# Patient Record
Sex: FEMALE | Race: WHITE | NOT HISPANIC OR LATINO | URBAN - METROPOLITAN AREA
[De-identification: names, ages, dates, MRNs, and addresses within clinical notes are randomized per-mention and may not be internally consistent; named-entity substitution may affect disease eponyms.]

---

## 2019-01-05 ENCOUNTER — OFFICE VISIT (OUTPATIENT)
Dept: RETAIL CLINIC | Facility: CLINIC | Age: 74
End: 2019-01-05

## 2019-01-05 VITALS
TEMPERATURE: 97.2 F | BODY MASS INDEX: 32.2 KG/M2 | SYSTOLIC BLOOD PRESSURE: 150 MMHG | HEART RATE: 85 BPM | OXYGEN SATURATION: 98 % | DIASTOLIC BLOOD PRESSURE: 75 MMHG | HEIGHT: 62 IN | WEIGHT: 175 LBS | RESPIRATION RATE: 20 BRPM

## 2019-01-05 DIAGNOSIS — J20.9 ACUTE BRONCHITIS, UNSPECIFIED ORGANISM: Primary | ICD-10-CM

## 2019-01-05 DIAGNOSIS — J30.9 ALLERGIC RHINITIS, UNSPECIFIED SEASONALITY, UNSPECIFIED TRIGGER: ICD-10-CM

## 2019-01-05 PROCEDURE — 99213 OFFICE O/P EST LOW 20 MIN: CPT | Performed by: NURSE PRACTITIONER

## 2019-01-05 RX ORDER — GUAIFENESIN 600 MG/1
1200 TABLET, EXTENDED RELEASE ORAL 2 TIMES DAILY PRN
Qty: 40 TABLET | Refills: 0 | Status: SHIPPED | OUTPATIENT
Start: 2019-01-05 | End: 2019-01-15

## 2019-01-05 RX ORDER — METHYLPREDNISOLONE 4 MG/1
TABLET ORAL
Qty: 21 TABLET | Refills: 0 | Status: SHIPPED | OUTPATIENT
Start: 2019-01-05

## 2019-01-05 RX ORDER — DOXYCYCLINE 100 MG/1
100 CAPSULE ORAL 2 TIMES DAILY
Qty: 20 CAPSULE | Refills: 0 | Status: SHIPPED | OUTPATIENT
Start: 2019-01-05 | End: 2019-01-15

## 2019-01-05 RX ORDER — BENZONATATE 200 MG/1
200 CAPSULE ORAL 3 TIMES DAILY PRN
Qty: 30 CAPSULE | Refills: 0 | Status: SHIPPED | OUTPATIENT
Start: 2019-01-05

## 2019-01-05 RX ORDER — ROSUVASTATIN CALCIUM 10 MG/1
10 TABLET, COATED ORAL DAILY
COMMUNITY

## 2019-01-05 RX ORDER — ALBUTEROL SULFATE 90 UG/1
2 AEROSOL, METERED RESPIRATORY (INHALATION) EVERY 4 HOURS PRN
COMMUNITY
End: 2019-01-05

## 2019-01-05 RX ORDER — LISINOPRIL 20 MG/1
20 TABLET ORAL DAILY
COMMUNITY

## 2019-01-05 RX ORDER — ALBUTEROL SULFATE 90 UG/1
2 AEROSOL, METERED RESPIRATORY (INHALATION) EVERY 4 HOURS PRN
Qty: 6.7 G | Refills: 0 | Status: SHIPPED | OUTPATIENT
Start: 2019-01-05 | End: 2019-02-04

## 2019-01-05 RX ORDER — FEXOFENADINE HYDROCHLORIDE 60 MG/1
60 TABLET, FILM COATED ORAL DAILY
Qty: 30 TABLET | Refills: 0 | Status: SHIPPED | OUTPATIENT
Start: 2019-01-05

## 2019-01-05 NOTE — PATIENT INSTRUCTIONS
Acute Bronchitis, Adult  Acute bronchitis is when air tubes (bronchi) in the lungs suddenly get swollen. The condition can make it hard to breathe. It can also cause these symptoms:  · A cough.  · Coughing up clear, yellow, or green mucus.  · Wheezing.  · Chest congestion.  · Shortness of breath.  · A fever.  · Body aches.  · Chills.  · A sore throat.    Follow these instructions at home:  Medicines  · Take over-the-counter and prescription medicines only as told by your doctor.  · If you were prescribed an antibiotic medicine, take it as told by your doctor. Do not stop taking the antibiotic even if you start to feel better.  General instructions  · Rest.  · Drink enough fluids to keep your pee (urine) clear or pale yellow.  · Avoid smoking and secondhand smoke. If you smoke and you need help quitting, ask your doctor. Quitting will help your lungs heal faster.  · Use an inhaler, cool mist vaporizer, or humidifier as told by your doctor.  · Keep all follow-up visits as told by your doctor. This is important.  How is this prevented?  To lower your risk of getting this condition again:  · Wash your hands often with soap and water. If you cannot use soap and water, use hand .  · Avoid contact with people who have cold symptoms.  · Try not to touch your hands to your mouth, nose, or eyes.  · Make sure to get the flu shot every year.    Contact a doctor if:  · Your symptoms do not get better in 2 weeks.  Get help right away if:  · You cough up blood.  · You have chest pain.  · You have very bad shortness of breath.  · You become dehydrated.  · You faint (pass out) or keep feeling like you are going to pass out.  · You keep throwing up (vomiting).  · You have a very bad headache.  · Your fever or chills gets worse.  This information is not intended to replace advice given to you by your health care provider. Make sure you discuss any questions you have with your health care provider.  Document Released:  06/05/2009 Document Revised: 07/26/2017 Document Reviewed: 06/07/2017  [employer] Interactive Patient Education © 2018 [employer]    -Pt to take home nasal spray as prescribed.   --Pt notified to recheck BP in 1-2 days with home BP cuff and f/u with PCP if continues to remain high.

## 2019-01-05 NOTE — PROGRESS NOTES
Subjective   Ale Antoine is a 73 y.o. female.     Pt has had a cough with congestion for the last week      URI    This is a new problem. The current episode started 1 to 4 weeks ago. The problem has been gradually worsening. There has been no fever. Associated symptoms include congestion (clear-green drainage), coughing, headaches, rhinorrhea and a sore throat (in the morning). Pertinent negatives include no abdominal pain, chest pain, diarrhea, dysuria, ear pain, joint pain, joint swelling, nausea, neck pain, plugged ear sensation, rash, sinus pain, sneezing, swollen glands, vomiting or wheezing. She has tried nothing for the symptoms. The treatment provided no relief.        Current Outpatient Medications on File Prior to Visit   Medication Sig Dispense Refill   • lisinopril (PRINIVIL,ZESTRIL) 20 MG tablet Take 20 mg by mouth Daily.     • rosuvastatin (CRESTOR) 10 MG tablet Take 10 mg by mouth Daily.     • [DISCONTINUED] albuterol sulfate  (90 Base) MCG/ACT inhaler Inhale 2 puffs Every 4 (Four) Hours As Needed for Wheezing.       No current facility-administered medications on file prior to visit.        Allergies   Allergen Reactions   • Ampicillin Rash   • Sulfa Antibiotics Rash       Past Medical History:   Diagnosis Date   • Allergic    • Asthma    • Elevated cholesterol    • Hypertension    • Kidney stone        Past Surgical History:   Procedure Laterality Date   • ARTERY BIOPSY      temporal arteries       Family History   Problem Relation Age of Onset   • Cancer Mother    • Cancer Father    • Cancer Sister    • Cancer Brother        Social History     Socioeconomic History   • Marital status: Unknown     Spouse name: Not on file   • Number of children: Not on file   • Years of education: Not on file   • Highest education level: Not on file   Social Needs   • Financial resource strain: Not on file   • Food insecurity - worry: Not on file   • Food insecurity - inability: Not on file   •  "Transportation needs - medical: Not on file   • Transportation needs - non-medical: Not on file   Occupational History   • Not on file   Tobacco Use   • Smoking status: Never Smoker   Substance and Sexual Activity   • Alcohol use: No     Frequency: Never   • Drug use: No   • Sexual activity: Defer   Other Topics Concern   • Not on file   Social History Narrative   • Not on file       Review of Systems   Constitutional: Positive for fatigue. Negative for activity change, appetite change, chills, diaphoresis and fever.   HENT: Positive for congestion (clear-green drainage), rhinorrhea, sore throat (in the morning) and voice change. Negative for ear pain, sinus pressure, sinus pain and sneezing.    Eyes: Negative for pain, discharge, redness and itching.   Respiratory: Positive for cough and chest tightness. Negative for shortness of breath and wheezing.    Cardiovascular: Negative for chest pain.   Gastrointestinal: Negative for abdominal pain, diarrhea, nausea and vomiting.   Genitourinary: Negative for dysuria.   Musculoskeletal: Negative for arthralgias, joint pain, myalgias and neck pain.   Skin: Negative for rash.   Allergic/Immunologic: Positive for environmental allergies.   Neurological: Positive for headaches.   Psychiatric/Behavioral: Negative for agitation.       /75   Pulse 85   Temp 97.2 °F (36.2 °C) (Oral)   Resp 20   Ht 157.5 cm (62\")   Wt 79.4 kg (175 lb)   SpO2 98%   BMI 32.01 kg/m²     Objective   Physical Exam   Constitutional: She is oriented to person, place, and time. She appears well-developed and well-nourished. She is cooperative. She appears ill.   HENT:   Head: Normocephalic and atraumatic.   Right Ear: Tympanic membrane, external ear and ear canal normal.   Left Ear: Tympanic membrane, external ear and ear canal normal.   Nose: Mucosal edema and rhinorrhea present. Right sinus exhibits maxillary sinus tenderness and frontal sinus tenderness. Left sinus exhibits no maxillary " sinus tenderness and no frontal sinus tenderness.   Mouth/Throat: Uvula is midline and mucous membranes are normal. Posterior oropharyngeal erythema present. No oropharyngeal exudate or posterior oropharyngeal edema.   Eyes: Conjunctivae and lids are normal.   Cardiovascular: Normal heart sounds.   Pulmonary/Chest: Effort normal and breath sounds normal.   Abdominal: There is no tenderness.   Lymphadenopathy:     She has cervical adenopathy.   Neurological: She is alert and oriented to person, place, and time.   Skin: Skin is warm and dry. No rash noted.   Psychiatric: She has a normal mood and affect. Her speech is normal and behavior is normal.       Procedures None    Assessment/Plan   Diagnoses and all orders for this visit:    Acute bronchitis, unspecified organism    Allergic rhinitis, unspecified seasonality, unspecified trigger    Other orders  -     benzonatate (TESSALON) 200 MG capsule; Take 1 capsule by mouth 3 (Three) Times a Day As Needed for Cough.  -     guaiFENesin (MUCINEX) 600 MG 12 hr tablet; Take 2 tablets by mouth 2 (Two) Times a Day As Needed for Cough for up to 10 days.  -     fexofenadine (ALLEGRA ALLERGY) 60 MG tablet; Take 1 tablet by mouth Daily.  -     doxycycline (MONODOX) 100 MG capsule; Take 1 capsule by mouth 2 (Two) Times a Day for 10 days.  -     albuterol sulfate  (90 Base) MCG/ACT inhaler; Inhale 2 puffs Every 4 (Four) Hours As Needed for Wheezing for up to 30 days.  -     MethylPREDNISolone (MEDROL, TIGIST,) 4 MG tablet; Take as directed on package instructions.        No results found for this or any previous visit.  -Education done on high BP with pt. Pt states it does not normally run this high.   Follow up with PCP or go to the nearest emergency room if symptoms worsen or fail to improve.

## 2023-06-07 ENCOUNTER — TRANSCRIBE ORDERS (OUTPATIENT)
Dept: ADMINISTRATIVE | Facility: HOSPITAL | Age: 78
End: 2023-06-07
Payer: MEDICARE

## 2023-06-07 DIAGNOSIS — Z12.31 VISIT FOR SCREENING MAMMOGRAM: Primary | ICD-10-CM

## 2024-07-18 PROBLEM — M85.80 OSTEOPENIA: Status: ACTIVE | Noted: 2024-07-18

## 2024-07-18 PROBLEM — L40.9 PSORIASIS: Status: ACTIVE | Noted: 2024-07-18

## 2024-07-18 PROBLEM — H35.30 MACULAR DEGENERATION: Status: ACTIVE | Noted: 2024-07-18

## 2024-07-18 PROBLEM — M19.90 OSTEOARTHRITIS: Status: ACTIVE | Noted: 2024-07-18

## 2024-07-18 PROBLEM — M70.62 TROCHANTERIC BURSITIS OF LEFT HIP: Status: ACTIVE | Noted: 2024-07-18

## 2024-07-18 PROBLEM — Z87.39 HISTORY OF TEMPORAL ARTERITIS: Status: ACTIVE | Noted: 2024-07-18

## 2024-07-18 NOTE — ASSESSMENT & PLAN NOTE
Doing well clinically today  Some intermittent hand pain and low back pain from OA  She now avoids NSAIDs due to coronary artery disease and history of GI ulcer  Continue Tylenol as needed

## 2024-07-18 NOTE — ASSESSMENT & PLAN NOTE
hx of; dz start July 2007, ESR>130,   negative temporal artery bx 8/7/2007  off steroids since 5/2009  Retina Dr. Sosa    inactive; in remission off steroids since 5/2009  No signs of recurrence clinically or on labs  No temporal headaches.  No vision changes.

## 2024-07-18 NOTE — ASSESSMENT & PLAN NOTE
Left hip trochanteric bursitis much improved after physical therapy and Medrol Dosepak.  Minimally bothering her presently

## 2024-07-18 NOTE — ASSESSMENT & PLAN NOTE
Bone density performed 7/19/22- T-score -1.6 left femoral neck, 11% worse compared to prior bone density scan 2018. Elevated FRAX score 3.1% risk hip fracture next 10 years  Prior alendronate ~1316-6810    She reports she has not been taking alendronate over the last year  She reports she was concerned about her kidneys due to history of renal cyst so she stopped taking the medication  Recommend calcium through diet, vitamin-D 2000 IU daily  Check labs today, including vitamin D level  Will order DEXA scan  Depending on results, we did discuss possible medication options such as Prolia or Reclast.  Risk benefits of bisphosphonates/prolia discussed in detail including but not limited to osteonecrosis jaw, atypical femoral fracture, bone death, kidney failure, hypocalcemia.  Patient has no reported jaw or tooth pain.  Return to clinic 1 year

## 2024-07-18 NOTE — PROGRESS NOTES
Office Visit       Date: 07/23/2024   Patient Name: Ale Antoine  MRN: [id number]  YOB: 1945    Referring Physician: Juan Jose Childers MD     Chief Complaint:   Chief Complaint   Patient presents with    Osteopenia    Osteoarthritis       History of Present Illness: Ale Antoine is a 78 y.o. female who is here today for follow-up for generalized osteoarthritis, osteopenia and remote hx of PMR/temporal arteritis diagnosed 2007. Temporal artery biopsy was negative August 2007. She has been off steroids since 5/2009 without recurrence.    No further headaches.  No jaw claudication.  No vision changes.    no scalp tenderness or enlargement of temporal arteritis.  Her GCA has been in remission off steroids since 2009    She follows with our office annually for history of osteopenia.  We previously prescribed her alendronate.  Today she reports she stopped taking this sometime last summer she thinks. She has a history of a renal cyst and she was concerned about possible side effects so she stopped taking it.   In the interim she had 2 heart stents placed.    She follows with Dr. Sosa for macular degeneration.  She has mild psoriasis followed by [facility]  Prior left hip pain improved after Medrol Dosepak and PT.  No hip pain today.  She now avoids NSAIDs due to coronary artery disease and history of GI ulceration found on endoscopy 2023.      Subjective   Review of systems:  Review of Systems   Cardiovascular:  Positive for chest pain.   Gastrointestinal:         Heartburn   Musculoskeletal:  Positive for arthralgias, myalgias and neck stiffness.   Skin:         Psoriasis   Neurological:  Positive for dizziness.   Hematological:  Bruises/bleeds easily.   All other systems reviewed and are negative.      Past Medical History:   Past Medical History:   Diagnosis Date    Allergic     Arthralgia     Asthma     Chronic osteoarthritis     Dyslipidemia     Elevated cholesterol     Fatigue   "   Hypertension     Kidney stone     Polymyalgia rheumatica        Past Surgical History:   Past Surgical History:   Procedure Laterality Date    ARTERY BIOPSY      temporal arteries       Family History:   Family History   Problem Relation Age of Onset    Cancer Mother     Cancer Father     Cancer Sister     Cancer Brother        Social History:   Social History     Socioeconomic History    Marital status: Unknown   Tobacco Use    Smoking status: Never    Smokeless tobacco: Never   Vaping Use    Vaping status: Never Used   Substance and Sexual Activity    Alcohol use: No    Drug use: No    Sexual activity: Defer       Medications:   Current Outpatient Medications:     albuterol sulfate  (90 Base) MCG/ACT inhaler, Inhale., Disp: , Rfl:     aspirin 81 MG EC tablet, Take 1 tablet by mouth Daily., Disp: , Rfl:     atorvastatin (LIPITOR) 80 MG tablet, Take 1 tablet by mouth Daily., Disp: , Rfl:     Cholecalciferol (Vitamin D3) 50 MCG (2000 UT) tablet, Take  by mouth., Disp: , Rfl:     clopidogrel (PLAVIX) 75 MG tablet, Take 1 tablet by mouth Daily., Disp: , Rfl:     lisinopril (PRINIVIL,ZESTRIL) 20 MG tablet, Take 1 tablet by mouth Daily., Disp: , Rfl:     metoprolol tartrate (LOPRESSOR) 25 MG tablet, Take 0.5 tablets (12.5 mg total) by mouth 2 (two) times daily., Disp: , Rfl:     Allergies:   Allergies   Allergen Reactions    Levofloxacin Provider Review Needed    Statins Provider Review Needed    Ampicillin Rash    Sulfa Antibiotics Rash       I reviewed the patient's chief complaint, history of present illness, review of systems, past medical history, surgical history, family history, social history, medications and allergy list.     Objective    Vital Signs:   Vitals:    07/23/24 1121   BP: 130/70   BP Location: Left arm   Patient Position: Sitting   Cuff Size: Adult   Pulse: 56   Temp: 97.8 °F (36.6 °C)   Weight: 76.1 kg (167 lb 11.2 oz)   Height: 157.5 cm (62.01\")   PainSc:   3     Body mass index is 30.66 " kg/m².   BMI cannot be calculated due to outdated height or weight values.  Please input a current height/weight in Vitals and re-renter BMIFOLLOWUP in Note to pull in correct documentation based on BMI range.       Physical Exam:  Physical Exam  Constitutional:       Appearance: Normal appearance.   HENT:      Head: Normocephalic and atraumatic.   Eyes:      Conjunctiva/sclera: Conjunctivae normal.      Pupils: Pupils are equal, round, and reactive to light.   Cardiovascular:      Rate and Rhythm: Normal rate and regular rhythm.      Heart sounds: Normal heart sounds.   Pulmonary:      Effort: Pulmonary effort is normal.      Breath sounds: Normal breath sounds.   Musculoskeletal:         General: Normal range of motion.      Cervical back: Normal range of motion.      Comments: OA changes hands   Skin:     General: Skin is warm and dry.   Neurological:      General: No focal deficit present.      Mental Status: She is alert and oriented to person, place, and time.   Psychiatric:         Mood and Affect: Mood normal.         Behavior: Behavior normal.         Thought Content: Thought content normal.         Judgment: Judgment normal.          Results Review:   Imaging Results (Last 24 Hours)       ** No results found for the last 24 hours. **            Assessment / Plan    Assessment/Plan:   Diagnoses and all orders for this visit:    1. Osteopenia, unspecified location (Primary)  Assessment & Plan:  Bone density performed 7/19/22- T-score -1.6 left femoral neck, 11% worse compared to prior bone density scan 2018. Elevated FRAX score 3.1% risk hip fracture next 10 years  Prior alendronate ~2792-2263    She reports she has not been taking alendronate over the last year  She reports she was concerned about her kidneys due to history of renal cyst so she stopped taking the medication  Recommend calcium through diet, vitamin-D 2000 IU daily  Check labs today, including vitamin D level  Will order DEXA scan  Depending  on results, we did discuss possible medication options such as Prolia or Reclast.  Risk benefits of bisphosphonates/prolia discussed in detail including but not limited to osteonecrosis jaw, atypical femoral fracture, bone death, kidney failure, hypocalcemia.  Patient has no reported jaw or tooth pain.  Return to clinic 1 year    Orders:  -     DEXA Bone Density Axial; Future  -     Comprehensive Metabolic Panel; Future  -     CBC Auto Differential; Future  -     Vitamin D,25-Hydroxy; Future    2. Trochanteric bursitis of left hip  Assessment & Plan:  Left hip trochanteric bursitis much improved after physical therapy and Medrol Dosepak.  Minimally bothering her presently      3. Osteoarthritis, unspecified osteoarthritis type, unspecified site  Assessment & Plan:  Doing well clinically today  Some intermittent hand pain and low back pain from OA  She now avoids NSAIDs due to coronary artery disease and history of GI ulcer  Continue Tylenol as needed      4. History of temporal arteritis  Assessment & Plan:  hx of; dz start July 2007, ESR>130,   negative temporal artery bx 8/7/2007  off steroids since 5/2009  Retina Dr. Quintana    inactive; in remission off steroids since 5/2009  No signs of recurrence clinically or on labs  No temporal headaches.  No vision changes.      5. Psoriasis  Assessment & Plan:  Follows with SIMRAN Jin.  Stable      6. Macular degeneration, unspecified laterality, unspecified type  Assessment & Plan:  Retina specialist dr emilia quintana.      7. Post-menopause  -     DEXA Bone Density Axial; Future    8. Acquired complex cyst of kidney  Assessment & Plan:  She follows closely with urology and nephrology  She reports this has been stable over the last 5 years          Follow Up:   Return in about 1 year (around 7/23/2025) for Dr. Li.        JEFF Padilla  [facility]

## 2024-07-23 ENCOUNTER — OFFICE VISIT (OUTPATIENT)
Age: 79
End: 2024-07-23
Payer: MEDICARE

## 2024-07-23 ENCOUNTER — LAB (OUTPATIENT)
Facility: HOSPITAL | Age: 79
End: 2024-07-23
Payer: MEDICARE

## 2024-07-23 VITALS
HEART RATE: 56 BPM | DIASTOLIC BLOOD PRESSURE: 70 MMHG | HEIGHT: 62 IN | WEIGHT: 167.7 LBS | SYSTOLIC BLOOD PRESSURE: 130 MMHG | BODY MASS INDEX: 30.86 KG/M2 | TEMPERATURE: 97.8 F

## 2024-07-23 DIAGNOSIS — Z87.39 HISTORY OF TEMPORAL ARTERITIS: ICD-10-CM

## 2024-07-23 DIAGNOSIS — N28.1 ACQUIRED COMPLEX CYST OF KIDNEY: ICD-10-CM

## 2024-07-23 DIAGNOSIS — M85.80 OSTEOPENIA, UNSPECIFIED LOCATION: ICD-10-CM

## 2024-07-23 DIAGNOSIS — L40.9 PSORIASIS: ICD-10-CM

## 2024-07-23 DIAGNOSIS — H35.30 MACULAR DEGENERATION, UNSPECIFIED LATERALITY, UNSPECIFIED TYPE: ICD-10-CM

## 2024-07-23 DIAGNOSIS — M19.90 OSTEOARTHRITIS, UNSPECIFIED OSTEOARTHRITIS TYPE, UNSPECIFIED SITE: ICD-10-CM

## 2024-07-23 DIAGNOSIS — M85.80 OSTEOPENIA, UNSPECIFIED LOCATION: Primary | ICD-10-CM

## 2024-07-23 DIAGNOSIS — M70.62 TROCHANTERIC BURSITIS OF LEFT HIP: ICD-10-CM

## 2024-07-23 DIAGNOSIS — Z78.0 POST-MENOPAUSE: ICD-10-CM

## 2024-07-23 PROBLEM — R73.03 PREDIABETES: Status: ACTIVE | Noted: 2022-02-21

## 2024-07-23 PROBLEM — E78.5 HYPERLIPEMIA: Status: ACTIVE | Noted: 2022-02-21

## 2024-07-23 PROBLEM — I10 HYPERTENSION: Status: ACTIVE | Noted: 2022-02-21

## 2024-07-23 PROBLEM — N20.0 KIDNEY STONES: Status: ACTIVE | Noted: 2022-10-28

## 2024-07-23 PROBLEM — G47.30 SLEEP APNEA: Status: ACTIVE | Noted: 2021-06-04

## 2024-07-23 PROBLEM — I25.10 CORONARY ARTERY DISEASE INVOLVING NATIVE CORONARY ARTERY OF NATIVE HEART: Status: ACTIVE | Noted: 2024-02-28

## 2024-07-23 PROBLEM — M54.42 CHRONIC BILATERAL LOW BACK PAIN WITH BILATERAL SCIATICA: Status: ACTIVE | Noted: 2022-10-28

## 2024-07-23 PROBLEM — R01.1 HEART MURMUR: Status: ACTIVE | Noted: 2024-02-28

## 2024-07-23 PROBLEM — G89.29 CHRONIC BILATERAL LOW BACK PAIN WITH BILATERAL SCIATICA: Status: ACTIVE | Noted: 2022-10-28

## 2024-07-23 PROBLEM — M54.41 CHRONIC BILATERAL LOW BACK PAIN WITH BILATERAL SCIATICA: Status: ACTIVE | Noted: 2022-10-28

## 2024-07-23 LAB
BASOPHILS # BLD AUTO: 0.05 10*3/MM3 (ref 0–0.2)
BASOPHILS NFR BLD AUTO: 0.7 % (ref 0–1.5)
DEPRECATED RDW RBC AUTO: 42.2 FL (ref 37–54)
EOSINOPHIL # BLD AUTO: 0.65 10*3/MM3 (ref 0–0.4)
EOSINOPHIL NFR BLD AUTO: 8.5 % (ref 0.3–6.2)
ERYTHROCYTE [DISTWIDTH] IN BLOOD BY AUTOMATED COUNT: 12.3 % (ref 12.3–15.4)
HCT VFR BLD AUTO: 39.3 % (ref 34–46.6)
HGB BLD-MCNC: 13.1 G/DL (ref 12–15.9)
IMM GRANULOCYTES # BLD AUTO: 0.02 10*3/MM3 (ref 0–0.05)
IMM GRANULOCYTES NFR BLD AUTO: 0.3 % (ref 0–0.5)
LYMPHOCYTES # BLD AUTO: 1.87 10*3/MM3 (ref 0.7–3.1)
LYMPHOCYTES NFR BLD AUTO: 24.5 % (ref 19.6–45.3)
MCH RBC QN AUTO: 31.8 PG (ref 26.6–33)
MCHC RBC AUTO-ENTMCNC: 33.3 G/DL (ref 31.5–35.7)
MCV RBC AUTO: 95.4 FL (ref 79–97)
MONOCYTES # BLD AUTO: 0.69 10*3/MM3 (ref 0.1–0.9)
MONOCYTES NFR BLD AUTO: 9 % (ref 5–12)
NEUTROPHILS NFR BLD AUTO: 4.36 10*3/MM3 (ref 1.7–7)
NEUTROPHILS NFR BLD AUTO: 57 % (ref 42.7–76)
NRBC BLD AUTO-RTO: 0 /100 WBC (ref 0–0.2)
PLATELET # BLD AUTO: 203 10*3/MM3 (ref 140–450)
PMV BLD AUTO: 10.7 FL (ref 6–12)
RBC # BLD AUTO: 4.12 10*6/MM3 (ref 3.77–5.28)
WBC NRBC COR # BLD AUTO: 7.64 10*3/MM3 (ref 3.4–10.8)

## 2024-07-23 PROCEDURE — 36415 COLL VENOUS BLD VENIPUNCTURE: CPT

## 2024-07-23 PROCEDURE — 82306 VITAMIN D 25 HYDROXY: CPT

## 2024-07-23 PROCEDURE — 3075F SYST BP GE 130 - 139MM HG: CPT | Performed by: NURSE PRACTITIONER

## 2024-07-23 PROCEDURE — 1160F RVW MEDS BY RX/DR IN RCRD: CPT | Performed by: NURSE PRACTITIONER

## 2024-07-23 PROCEDURE — 80053 COMPREHEN METABOLIC PANEL: CPT

## 2024-07-23 PROCEDURE — 85025 COMPLETE CBC W/AUTO DIFF WBC: CPT

## 2024-07-23 PROCEDURE — G2211 COMPLEX E/M VISIT ADD ON: HCPCS | Performed by: NURSE PRACTITIONER

## 2024-07-23 PROCEDURE — 1159F MED LIST DOCD IN RCRD: CPT | Performed by: NURSE PRACTITIONER

## 2024-07-23 PROCEDURE — 3078F DIAST BP <80 MM HG: CPT | Performed by: NURSE PRACTITIONER

## 2024-07-23 PROCEDURE — 99214 OFFICE O/P EST MOD 30 MIN: CPT | Performed by: NURSE PRACTITIONER

## 2024-07-23 RX ORDER — CHOLECALCIFEROL (VITAMIN D3) 50 MCG
TABLET ORAL
COMMUNITY

## 2024-07-23 RX ORDER — ATORVASTATIN CALCIUM 80 MG/1
80 TABLET, FILM COATED ORAL DAILY
COMMUNITY
Start: 2024-02-28

## 2024-07-23 RX ORDER — CLOPIDOGREL BISULFATE 75 MG/1
75 TABLET ORAL DAILY
COMMUNITY
Start: 2024-05-29 | End: 2025-05-29

## 2024-07-23 RX ORDER — ASPIRIN 81 MG/1
81 TABLET ORAL DAILY
COMMUNITY

## 2024-07-23 NOTE — ASSESSMENT & PLAN NOTE
She follows closely with urology and nephrology  She reports this has been stable over the last 5 years

## 2024-07-24 LAB
25(OH)D3 SERPL-MCNC: 40.7 NG/ML (ref 30–100)
ALBUMIN SERPL-MCNC: 4.1 G/DL (ref 3.5–5.2)
ALBUMIN/GLOB SERPL: 1.5 G/DL
ALP SERPL-CCNC: 102 U/L (ref 39–117)
ALT SERPL W P-5'-P-CCNC: 23 U/L (ref 1–33)
ANION GAP SERPL CALCULATED.3IONS-SCNC: 10 MMOL/L (ref 5–15)
AST SERPL-CCNC: 20 U/L (ref 1–32)
BILIRUB SERPL-MCNC: 0.3 MG/DL (ref 0–1.2)
BUN SERPL-MCNC: 14 MG/DL (ref 8–23)
BUN/CREAT SERPL: 25.9 (ref 7–25)
CALCIUM SPEC-SCNC: 10.3 MG/DL (ref 8.6–10.5)
CHLORIDE SERPL-SCNC: 106 MMOL/L (ref 98–107)
CO2 SERPL-SCNC: 25 MMOL/L (ref 22–29)
CREAT SERPL-MCNC: 0.54 MG/DL (ref 0.57–1)
EGFRCR SERPLBLD CKD-EPI 2021: 93.2 ML/MIN/1.73
GLOBULIN UR ELPH-MCNC: 2.8 GM/DL
GLUCOSE SERPL-MCNC: 78 MG/DL (ref 65–99)
POTASSIUM SERPL-SCNC: 4.3 MMOL/L (ref 3.5–5.2)
PROT SERPL-MCNC: 6.9 G/DL (ref 6–8.5)
SODIUM SERPL-SCNC: 141 MMOL/L (ref 136–145)

## 2024-08-06 ENCOUNTER — HOSPITAL ENCOUNTER (OUTPATIENT)
Dept: BONE DENSITY | Facility: HOSPITAL | Age: 79
Discharge: HOME | End: 2024-08-06
Admitting: NURSE PRACTITIONER
Payer: MEDICARE

## 2024-08-06 DIAGNOSIS — Z78.0 POST-MENOPAUSE: ICD-10-CM

## 2024-08-06 DIAGNOSIS — M85.80 OSTEOPENIA, UNSPECIFIED LOCATION: ICD-10-CM

## 2024-08-06 PROCEDURE — 77080 DXA BONE DENSITY AXIAL: CPT

## 2025-07-23 ENCOUNTER — OFFICE VISIT (OUTPATIENT)
Age: 80
End: 2025-07-23
Payer: MEDICARE

## 2025-07-23 VITALS
TEMPERATURE: 97.6 F | DIASTOLIC BLOOD PRESSURE: 68 MMHG | HEART RATE: 64 BPM | HEIGHT: 62 IN | WEIGHT: 156.3 LBS | SYSTOLIC BLOOD PRESSURE: 124 MMHG | BODY MASS INDEX: 28.76 KG/M2

## 2025-07-23 DIAGNOSIS — Z87.39 HISTORY OF TEMPORAL ARTERITIS: ICD-10-CM

## 2025-07-23 DIAGNOSIS — G89.29 CHRONIC LOW BACK PAIN, UNSPECIFIED BACK PAIN LATERALITY, UNSPECIFIED WHETHER SCIATICA PRESENT: ICD-10-CM

## 2025-07-23 DIAGNOSIS — Z51.81 ENCOUNTER FOR MEDICATION MONITORING: ICD-10-CM

## 2025-07-23 DIAGNOSIS — E55.9 VITAMIN D DEFICIENCY: ICD-10-CM

## 2025-07-23 DIAGNOSIS — M19.90 OSTEOARTHRITIS, UNSPECIFIED OSTEOARTHRITIS TYPE, UNSPECIFIED SITE: Primary | ICD-10-CM

## 2025-07-23 DIAGNOSIS — M54.50 CHRONIC LOW BACK PAIN, UNSPECIFIED BACK PAIN LATERALITY, UNSPECIFIED WHETHER SCIATICA PRESENT: ICD-10-CM

## 2025-07-23 NOTE — PATIENT INSTRUCTIONS
"Osteoarthritis    Osteoarthritis is a type of arthritis. It refers to joint pain or joint disease. Osteoarthritis affects tissue that covers the ends of bones in joints (cartilage). Cartilage acts as a cushion between the bones and helps them move smoothly. Osteoarthritis occurs when cartilage in the joints gets worn down. Osteoarthritis is sometimes called \"wear and tear\" arthritis.  Osteoarthritis is the most common form of arthritis. It often occurs in older people. It is a condition that gets worse over time. The joints most often affected by this condition are in the fingers, toes, hips, knees, and spine, including the neck and lower back.    What are the causes?  This condition is caused by the wearing down of cartilage that covers the ends of bones.    What increases the risk?  The following factors may make you more likely to develop this condition:  Being age 50 or older.  Obesity.  Overuse of joints.  Past injury of a joint.  Past surgery on a joint.  Family history of osteoarthritis.    What are the signs or symptoms?  The main symptoms of this condition are pain, swelling, and stiffness in the joint. Other symptoms may include:  An enlarged joint.  More pain and further damage caused by small pieces of bone or cartilage that break off and float inside of the joint.  Small deposits of bone (osteophytes) that grow on the edges of the joint.  A grating or scraping feeling inside the joint when you move it.  Popping or creaking sounds when you move.  Difficulty walking or exercising.  An inability to  items, twist your hand, or control the movements of your hands and fingers.    How is this diagnosed?  This condition may be diagnosed based on:  Your medical history.  A physical exam.  Your symptoms.  X-rays of the affected joints.  Blood tests to rule out other types of arthritis.    How is this treated?  There is no cure for this condition, but treatment can help control pain and improve joint function. " Treatment may include a combination of therapies, such as:  Pain relief techniques, such as:  Applying heat and cold to the joint.  Massage.  A form of talk therapy called cognitive behavioral therapy (CBT). This therapy helps you set goals and follow up on the changes that you make.  Medicines for pain and inflammation. The medicines can be taken by mouth or applied to the skin. They include:  NSAIDs, such as ibuprofen.  Prescription medicines.  Strong anti-inflammatory medicines (corticosteroids).  Certain nutritional supplements.  A prescribed exercise program. You may work with a physical therapist.  Assistive devices, such as a brace, wrap, splint, specialized glove, or cane.  A weight control plan.  Surgery, such as:  An osteotomy. This is done to reposition the bones and relieve pain or to remove loose pieces of bone and cartilage.  Joint replacement surgery. You may need this surgery if you have advanced osteoarthritis.    Follow these instructions at home:    Activity  Rest your affected joints as told by your health care provider.  Exercise as told by your provider. The provider may recommend specific types of exercise, such as:  Strengthening exercises. These are done to strengthen the muscles that support joints affected by arthritis.  Aerobic activities. These are exercises, such as brisk walking or water aerobics, that increase your heart rate.  Range-of-motion activities. These help your joints move more easily.  Balance and agility exercises.    Managing pain, stiffness, and swelling         If told, apply heat to the affected area as often as told by your provider. Use the heat source that your provider recommends, such as a moist heat pack or a heating pad.  If you have a removable assistive device, remove it as told by your provider.  Place a towel between your skin and the heat source. If your provider tells you to keep the assistive device on while you apply heat, place a towel between the  assistive device and the heat source.  Leave the heat on for 20-30 minutes.  If told, put ice on the affected area.  If you have a removable assistive device, remove it as told by your provider.  Put ice in a plastic bag.  Place a towel between your skin and the bag. If your provider tells you to keep the assistive device on during icing, place a towel between the assistive device and the bag.  Leave the ice on for 20 minutes, 2-3 times a day.  If your skin turns bright red, remove the ice or heat right away to prevent skin damage. The risk of damage is higher if you cannot feel pain, heat, or cold.  Move your fingers or toes often to reduce stiffness and swelling.  Raise (elevate) the affected area above the level of your heart while you are sitting or lying down.    General instructions  Take over-the-counter and prescription medicines only as told by your provider.  Maintain a healthy weight. Follow instructions from your provider for weight control.  Do not use any products that contain nicotine or tobacco. These products include cigarettes, chewing tobacco, and vaping devices, such as e-cigarettes. If you need help quitting, ask your provider.  Use assistive devices as told by your provider.    Where to find more information  National [place] of Arthritis and Musculoskeletal and Skin Diseases: niams.nih.gov  National [place] on Aging: sri.nih.gov  American College of Rheumatology: rheumatology.org    Contact a health care provider if:  You have redness, swelling, or a feeling of warmth in a joint that gets worse.  You have a fever along with joint or muscle aches.  You develop a rash.  You have trouble doing your normal activities.  You have pain that gets worse and is not relieved by pain medicine.    This information is not intended to replace advice given to you by your health care provider. Make sure you discuss any questions you have with your health care provider.  Document Revised: 08/17/2023  Document Reviewed: 08/17/2023  [employer] Patient Education © 2024 [employer]

## 2025-07-23 NOTE — PROGRESS NOTES
Office Follow Up      Date: 07/23/2025   Patient Name: Ale Antoine  MRN: [id number]  YOB: 1945    Referring Physician: No ref. provider found     Chief Complaint:   Chief Complaint   Patient presents with   • Osteopenia   • Osteoarthritis       History of Present Illness: Ale Antoine is a 79 y.o. female who is here today for follow up on generalized osteoarthritis, osteopenia and remote hx of PMR/temporal arteritis diagnosed 2007.     Temporal artery biopsy was negative August 2007. She has been off steroids since 5/2009 without recurrence.    No further headaches.  No jaw claudication.  No vision changes.    no scalp tenderness or enlargement of temporal arteritis.  Her GCA has been in remission off steroids since 2009     She follows with our office annually for history of osteopenia.  We previously prescribed her alendronate.  She has a history of a renal cyst and she was concerned about possible side effects so she stopped taking it.   She has coronary disease status post coronary stent x 2  She follows with Dr. Sosa for macular degeneration.  She has mild psoriasis followed by [facility]  Prior left hip pain improved after Medrol Dosepak and PT.  No hip pain today.  She now avoids NSAIDs due to coronary artery disease and history of GI ulceration found on endoscopy 2023    History of Present Illness        Subjective       Review of Systems: Review of Systems   Constitutional:  Negative for chills, fatigue, fever and unexpected weight loss.   HENT:  Negative for mouth sores, sinus pressure and sore throat.    Eyes:  Negative for pain and redness.   Respiratory:  Negative for cough and shortness of breath.    Cardiovascular:  Negative for chest pain.   Gastrointestinal:  Negative for abdominal pain, blood in stool, diarrhea, nausea, vomiting and GERD.   Endocrine: Negative for polydipsia and polyuria.   Genitourinary:  Negative for dysuria, genital sores and hematuria.    Musculoskeletal:  Negative for arthralgias, back pain, joint swelling, myalgias, neck pain and neck stiffness.   Skin:  Negative for rash and bruise.   Neurological:  Negative for seizures, weakness, numbness and memory problem.   Hematological:  Negative for adenopathy. Does not bruise/bleed easily.   Psychiatric/Behavioral:  Negative for depressed mood. The patient is not nervous/anxious.         Past Medical History:   Past Medical History:   Diagnosis Date   • Allergic    • Arthralgia    • Asthma    • Chronic osteoarthritis    • Dyslipidemia    • Elevated cholesterol    • Fatigue    • Hypertension    • Kidney stone    • Polymyalgia rheumatica        Past Surgical History:   Past Surgical History:   Procedure Laterality Date   • ARTERY BIOPSY      temporal arteries       Family History:   Family History   Problem Relation Age of Onset   • Cancer Mother    • Cancer Father    • Cancer Sister    • Cancer Brother        Social History:   Social History     Socioeconomic History   • Marital status: Unknown   Tobacco Use   • Smoking status: Never   • Smokeless tobacco: Never   Vaping Use   • Vaping status: Never Used   Substance and Sexual Activity   • Alcohol use: No   • Drug use: No   • Sexual activity: Defer       Medications:   Current Outpatient Medications:   •  albuterol sulfate  (90 Base) MCG/ACT inhaler, Inhale., Disp: , Rfl:   •  atorvastatin (LIPITOR) 80 MG tablet, Take 1 tablet by mouth Daily., Disp: , Rfl:   •  Cholecalciferol (Vitamin D3) 50 MCG (2000 UT) tablet, Take  by mouth., Disp: , Rfl:   •  lisinopril (PRINIVIL,ZESTRIL) 20 MG tablet, Take 1 tablet by mouth Daily., Disp: , Rfl:   •  metoprolol tartrate (LOPRESSOR) 25 MG tablet, Take 0.5 tablets (12.5 mg total) by mouth 2 (two) times daily., Disp: , Rfl:     Allergies:   Allergies   Allergen Reactions   • Ampicillin Rash   • Levofloxacin Unknown - Low Severity   • Statins Unknown - Low Severity   • Sulfa Antibiotics Rash           Objective   "      Vital Signs:   Vitals:    07/23/25 1112   BP: 124/68   BP Location: Left arm   Patient Position: Sitting   Cuff Size: Adult   Pulse: 64   Temp: 97.6 °F (36.4 °C)   Weight: 70.9 kg (156 lb 4.8 oz)   Height: 157.5 cm (62\")   PainSc: 0-No pain     Body mass index is 28.59 kg/m².      Physical Exam:  Physical Exam   MUSCULOSKELETAL:   No peripheral synovitis  Osteoarthritis changes hands    Complete joint exam was performed including the MCPs, PIPs, DIPs of the hands, wrists, elbows, shoulders, hips, knees and ankles.  No soft tissue swelling or tenderness is present except as above.    General: The patient is well-developed and well nourished. Cooperative, alert and oriented. Affect is normal. Hydration appears normal.   HEENT: Normocephalic and atraumatic. Lids and conjunctiva are normal. Pupils are equal and sclera are clear. Oropharynx is clear   NECK neck is supple without adenopathy, masses or thyromegaly.   CARDIOVASCULAR: Regular rate and rhythm. No murmurs, rubs or gallops   LUNGS: Effort is normal. Lungs are clear bilateral   ABDOMEN: Not examined  EXTREMITIES: Peripheral pulses are intact. No clubbing.   SKIN: No rashes. No subcutaneous nodules. No digital ulcers. No sclerodactyly.   NEUROLOGIC: Gait is normal. Strength testing is normal.  No focal neurologic deficits    Results Review:   Labs:   Lab Results   Component Value Date    GLUCOSE 78 07/23/2024    BUN 14 07/23/2024    CREATININE 0.54 (L) 07/23/2024    EGFR 93.2 07/23/2024    BCR 25.9 (H) 07/23/2024    K 4.3 07/23/2024    CO2 25.0 07/23/2024    CALCIUM 10.3 07/23/2024    ALBUMIN 4.1 07/23/2024    BILITOT 0.3 07/23/2024    AST 20 07/23/2024    ALT 23 07/23/2024     Lab Results   Component Value Date    WBC 7.64 07/23/2024    HGB 13.1 07/23/2024    HCT 39.3 07/23/2024    MCV 95.4 07/23/2024     07/23/2024     No results found for: \"SEDRATE\"  No results found for: \"CRP\"  No results found for: \"QUANTIFERO\", \"QUANTITB1\", \"QUANTITB2\", " "\"QUANTIFERN\", \"QUANTIFERM\", \"QUANTITBGLDP\"  No results found for: \"RF\"  No results found for: \"HEPBSAG\", \"HEPAIGM\", \"HEPBIGMCORE\", \"HEPCVIRUSABY\"      Procedures    Assessment / Plan        - Osteopenia  Assessment & Plan:  - DXA 7/19/22- T-score -1.6 left femoral neck, 11% worse compared to prior bone density scan 2018. Elevated FRAX score 3.1% risk hip fracture next 10 years  - DXA 8/6/24: Osteopenia hips left femoral neck T score -1.3, right femoral neck T score -1.2, no FRAX calculated  Prior alendronate ~0514-0278     She reports she has not been taking alendronate since 2023  She reports she was concerned about her kidneys due to history of renal cyst so she stopped taking the medication  She prefers not to add bisphosphonate at this time which is reasonable  Recommend calcium through diet, vitamin-D 2000 IU daily  Check labs today, including vitamin D level  Next DXA due 8/26 for monitoring  Return to clinic 1 year        - Osteoarthritis, generalized  Assessment & Plan:  Doing well clinically today   Some intermittent hand pain and low back pain from OA  She now avoids NSAIDs due to coronary artery disease and history of GI ulcer  Continue Tylenol as needed    PT ordered for low back pain and home back exercises provided        - History of temporal arteritis / GCA  Assessment & Plan:  hx of; dz start July 2007, ESR>130,   negative temporal artery bx 8/7/2007  off steroids since 5/2009  Retina Dr. Sosa     inactive; in remission off steroids since 5/2009  No signs of recurrence clinically or on labs  No temporal headaches.  No vision changes.        - Psoriasis  Assessment & Plan:  Follows with SIMRAN Jin.  Stable        - Macular degeneration  Assessment & Plan:  Retina specialist dr emilia sosa.        - Acquired complex cyst of kidney  Assessment & Plan:  She follows with urology and nephrology  She reports this has been stable over the last 5 years      1. Osteoarthritis, unspecified " osteoarthritis type, unspecified site    2. Chronic low back pain, unspecified back pain laterality, unspecified whether sciatica present    3. Vitamin D deficiency    4. Encounter for medication monitoring    5. History of temporal arteritis        Assessment & Plan        Orders Placed This Encounter   Procedures   • CBC Auto Differential   • Comprehensive Metabolic Panel   • Vitamin D,25-Hydroxy   • C-reactive Protein   • Sedimentation Rate   • Ambulatory Referral to Physical Therapy for Evaluation & Treatment     No orders of the defined types were placed in this encounter.      Follow Up:   Return in about 1 year (around 7/23/2026).      Discussed plan of care in detail with the patient today.  Patient verbalized understanding and agrees.    I confirm accuracy of unchanged data/findings which have been carried forward from previous visit.  I have updated appropriately those that have changed.        Monico Li MD  [facility]

## 2025-07-24 LAB
25(OH)D3+25(OH)D2 SERPL-MCNC: 40.7 NG/ML (ref 30–100)
ALBUMIN SERPL-MCNC: 4 G/DL (ref 3.5–5.2)
ALBUMIN/GLOB SERPL: 1.6 G/DL
ALP SERPL-CCNC: 103 U/L (ref 39–117)
ALT SERPL-CCNC: 33 U/L (ref 1–33)
AST SERPL-CCNC: 28 U/L (ref 1–32)
BASOPHILS # BLD AUTO: 0.04 10*3/MM3 (ref 0–0.2)
BASOPHILS NFR BLD AUTO: 0.5 % (ref 0–1.5)
BILIRUB SERPL-MCNC: 0.5 MG/DL (ref 0–1.2)
BUN SERPL-MCNC: 15 MG/DL (ref 8–23)
BUN/CREAT SERPL: 22.4 (ref 7–25)
CALCIUM SERPL-MCNC: 10.1 MG/DL (ref 8.6–10.5)
CHLORIDE SERPL-SCNC: 105 MMOL/L (ref 98–107)
CO2 SERPL-SCNC: 25.7 MMOL/L (ref 22–29)
CREAT SERPL-MCNC: 0.67 MG/DL (ref 0.57–1)
CRP SERPL-MCNC: <0.3 MG/DL (ref 0–0.5)
EGFRCR SERPLBLD CKD-EPI 2021: 87.9 ML/MIN/1.73
EOSINOPHIL # BLD AUTO: 0.19 10*3/MM3 (ref 0–0.4)
EOSINOPHIL NFR BLD AUTO: 2.5 % (ref 0.3–6.2)
ERYTHROCYTE [DISTWIDTH] IN BLOOD BY AUTOMATED COUNT: 12.5 % (ref 12.3–15.4)
ERYTHROCYTE [SEDIMENTATION RATE] IN BLOOD BY WESTERGREN METHOD: 15 MM/HR (ref 0–30)
GLOBULIN SER CALC-MCNC: 2.5 GM/DL
GLUCOSE SERPL-MCNC: 88 MG/DL (ref 65–99)
HCT VFR BLD AUTO: 38.6 % (ref 34–46.6)
HGB BLD-MCNC: 13 G/DL (ref 12–15.9)
IMM GRANULOCYTES # BLD AUTO: 0.03 10*3/MM3 (ref 0–0.05)
IMM GRANULOCYTES NFR BLD AUTO: 0.4 % (ref 0–0.5)
LYMPHOCYTES # BLD AUTO: 1.83 10*3/MM3 (ref 0.7–3.1)
LYMPHOCYTES NFR BLD AUTO: 24.3 % (ref 19.6–45.3)
MCH RBC QN AUTO: 32.1 PG (ref 26.6–33)
MCHC RBC AUTO-ENTMCNC: 33.7 G/DL (ref 31.5–35.7)
MCV RBC AUTO: 95.3 FL (ref 79–97)
MONOCYTES # BLD AUTO: 0.69 10*3/MM3 (ref 0.1–0.9)
MONOCYTES NFR BLD AUTO: 9.2 % (ref 5–12)
NEUTROPHILS # BLD AUTO: 4.76 10*3/MM3 (ref 1.7–7)
NEUTROPHILS NFR BLD AUTO: 63.1 % (ref 42.7–76)
NRBC BLD AUTO-RTO: 0 /100 WBC (ref 0–0.2)
PLATELET # BLD AUTO: 199 10*3/MM3 (ref 140–450)
POTASSIUM SERPL-SCNC: 4.8 MMOL/L (ref 3.5–5.2)
PROT SERPL-MCNC: 6.5 G/DL (ref 6–8.5)
RBC # BLD AUTO: 4.05 10*6/MM3 (ref 3.77–5.28)
SODIUM SERPL-SCNC: 140 MMOL/L (ref 136–145)
WBC # BLD AUTO: 7.54 10*3/MM3 (ref 3.4–10.8)